# Patient Record
Sex: FEMALE | Race: ASIAN | NOT HISPANIC OR LATINO
[De-identification: names, ages, dates, MRNs, and addresses within clinical notes are randomized per-mention and may not be internally consistent; named-entity substitution may affect disease eponyms.]

---

## 2021-09-10 ENCOUNTER — NON-APPOINTMENT (OUTPATIENT)
Age: 31
End: 2021-09-10

## 2021-09-10 ENCOUNTER — APPOINTMENT (OUTPATIENT)
Dept: PULMONOLOGY | Facility: CLINIC | Age: 31
End: 2021-09-10
Payer: COMMERCIAL

## 2021-09-10 VITALS
HEART RATE: 71 BPM | SYSTOLIC BLOOD PRESSURE: 84 MMHG | HEIGHT: 66 IN | WEIGHT: 114.64 LBS | OXYGEN SATURATION: 99 % | BODY MASS INDEX: 18.42 KG/M2 | DIASTOLIC BLOOD PRESSURE: 70 MMHG | TEMPERATURE: 99.6 F

## 2021-09-10 PROBLEM — Z00.00 ENCOUNTER FOR PREVENTIVE HEALTH EXAMINATION: Status: ACTIVE | Noted: 2021-09-10

## 2021-09-10 PROCEDURE — 99204 OFFICE O/P NEW MOD 45 MIN: CPT | Mod: 25

## 2021-09-10 PROCEDURE — 94010 BREATHING CAPACITY TEST: CPT

## 2021-09-10 NOTE — PHYSICAL EXAM
[No Acute Distress] : no acute distress [Normal Rate/Rhythm] : normal rate/rhythm [Normal S1, S2] : normal s1, s2 [No Murmurs] : no murmurs [No Resp Distress] : no resp distress [Clear to Auscultation Bilaterally] : clear to auscultation bilaterally [No Edema] : no edema [Normal Affect] : normal affect

## 2022-01-10 ENCOUNTER — APPOINTMENT (OUTPATIENT)
Dept: PULMONOLOGY | Facility: CLINIC | Age: 32
End: 2022-01-10

## 2022-03-11 NOTE — HISTORY OF PRESENT ILLNESS
[TextBox_4] : 09/10/2021: Self-referred for lung nodules. Here w her boss/friend. Was working in [place] adhering [place] hours, was fatigued. Experienced sore throat and cough, on and off since early May. Symptoms would be triggered by traveling to new environments. Evaluated in hospital in [place], had CT chest and found 4 nodules: 3mm, 2mm, 4mm, 3mm. One of these 4 is ground glass, 4mm on the left. Her cousin  of lung cancer last year at age 36, small cell. No other family hx lung cancer. The patient is a never smoker. 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 Works long hours and eats poorly. Born [place], didn't have Covid, vaccinated for Covid.

## 2022-03-11 NOTE — ASSESSMENT
[FreeTextEntry1] : Data reviewed:\par \par CT chest provided on film, 2021, limited images, limited utility but personally reviewed and no significant structural abnormality. The report, which the patient reads as it is in Chinese, shows 4 nodules <5mm as noted above.\par \par [place] 09/10/2021 : normal\par \par Impression:\par Subcm lung nodules, nonsmoker\par Cousin  of SCLC\par \par Plan:\par Discussed that per Wade, no follow up is needed. This is a very low risk situation, and the cousin's history, while tragic, has no real significance for this patient's lung nodules. However, there is a significant anxiety component given the family background and we will get a follow up scan in 6 mos [place] in the [place] for reassurance. If not covered the patient will pay. Reminder set. She should try to get her previous images on a disk if possible, for direct comparison.\par --\par CT chest [facility] 3/2022 personally reviewed : no substantial abnormality; read as 2mm nodules - review at her appointment and no follow up imaging will be needed

## 2022-04-01 ENCOUNTER — APPOINTMENT (OUTPATIENT)
Dept: PULMONOLOGY | Facility: CLINIC | Age: 32
End: 2022-04-01
Payer: COMMERCIAL

## 2022-04-01 VITALS
SYSTOLIC BLOOD PRESSURE: 110 MMHG | OXYGEN SATURATION: 100 % | HEIGHT: 66 IN | WEIGHT: 121 LBS | HEART RATE: 85 BPM | DIASTOLIC BLOOD PRESSURE: 84 MMHG | TEMPERATURE: 97.8 F | BODY MASS INDEX: 19.44 KG/M2

## 2022-04-01 DIAGNOSIS — R91.8 OTHER NONSPECIFIC ABNORMAL FINDING OF LUNG FIELD: ICD-10-CM

## 2022-04-01 PROCEDURE — 99213 OFFICE O/P EST LOW 20 MIN: CPT

## 2022-04-01 NOTE — HISTORY OF PRESENT ILLNESS
[TextBox_4] : 09/10/2021: Self-referred for lung nodules. Here w her boss/friend. Was working in [place], was fatigued. Experienced sore throat and cough, on and off since early May. Symptoms would be triggered by traveling to new environments. Evaluated in hospital in [place], had CT chest and found 4 nodules: 3mm, 2mm, 4mm, 3mm. One of these 4 is ground glass, 4mm on the left. Her cousin  of lung cancer last year at age 36, small cell. No other family hx lung cancer. The patient is a never smoker. 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 Works long hours and eats poorly. Born [place], didn't have Covid, vaccinated for Covid.\par \par 21: Had Covid last month w dry throat only, has recovered. Vaccinated. No other health issues. Returns to review her CT chest.

## 2022-04-01 NOTE — ASSESSMENT
[FreeTextEntry1] : Data reviewed:\par \par CT chest provided on film, 2021, limited images, limited utility but prev reviewed and no significant structural abnormality. The report, which the patient read to me, shows 4 nodules <5mm as noted above.\par CT chest [facility] 3/2022 personally reviewed : no substantial abnormality; read as 2 2mm nodules\par \par [place] 09/10/2021 : normal\par \par Impression:\par Subcm lung nodules, nonsmoker\par Cousin  of SCLC\par \par Plan:\par Reassurance.\par Nothing on this scan requires follow up.